# Patient Record
Sex: MALE | Race: WHITE
[De-identification: names, ages, dates, MRNs, and addresses within clinical notes are randomized per-mention and may not be internally consistent; named-entity substitution may affect disease eponyms.]

---

## 2018-01-22 ENCOUNTER — HOSPITAL ENCOUNTER (OUTPATIENT)
Dept: HOSPITAL 47 - ORWHC2ENDO | Age: 68
Discharge: HOME | End: 2018-01-22
Attending: SURGERY
Payer: MEDICARE

## 2018-01-22 VITALS — SYSTOLIC BLOOD PRESSURE: 115 MMHG | RESPIRATION RATE: 16 BRPM | HEART RATE: 81 BPM | DIASTOLIC BLOOD PRESSURE: 71 MMHG

## 2018-01-22 VITALS — TEMPERATURE: 98.4 F

## 2018-01-22 VITALS — BODY MASS INDEX: 30.8 KG/M2

## 2018-01-22 DIAGNOSIS — K57.30: ICD-10-CM

## 2018-01-22 DIAGNOSIS — Z87.891: ICD-10-CM

## 2018-01-22 DIAGNOSIS — I10: ICD-10-CM

## 2018-01-22 DIAGNOSIS — Z79.899: ICD-10-CM

## 2018-01-22 DIAGNOSIS — Z12.11: Primary | ICD-10-CM

## 2018-01-22 NOTE — P.OP
Date of Procedure: 01/22/18


Preoperative Diagnosis: 


Screening colonoscopy


Postoperative Diagnosis: 


Diverticulosis


Procedure(s) Performed: 


Colonoscopy


Anesthesia: MAC


Surgeon: Jorge Lambert


Pathology: none sent


Condition: stable


Disposition: PACU


Description of Procedure: 


Patient's placed on the endoscopy table in the lateral position.  He received 

IV sedation.  Digital rectal exam was performed which revealed no 

abnormalities.  The prostate was symmetrical without nodules.  The flexible 

colonoscope was then placed patient anus and passed throughout the entire 

colon.  The ileocecal valve was visualized.  The cecum, ascending and 

transverse colon appeared normal.  The descending and sigmoid colon had 

moderate diverticular changes.  Scope was then brought back the rectum and this 

appeared normal.  Scope was withdrawn for patient.

## 2018-01-22 NOTE — P.GSHP
History of Present Illness


H&P Date: 01/22/18


Chief Complaint: Screening colonoscopy





This a 67-year-old male referred from Dr. Santiago.  Patient rents today for 

screening colonoscopy.  He denies a significant GI complaints.





Past Medical History


Past Medical History: Hypertension


History of Any Multi-Drug Resistant Organisms: None Reported


Additional Past Surgical History / Comment(s): colonoscopy


Past Anesthesia/Blood Transfusion Reactions: No Reported Reaction


Smoking Status: Former smoker





- Past Family History


  ** Mother


Family Medical History: Cancer





Medications and Allergies


 Home Medications











 Medication  Instructions  Recorded  Confirmed  Type


 


Losartan-Hctz 50-12.5 mg [Hyzaar 1 each PO DAILY 01/18/18 01/22/18 History





50-12.5]    











 Allergies











Allergy/AdvReac Type Severity Reaction Status Date / Time


 


No Known Allergies Allergy   Verified 01/22/18 09:24














Surgical - Exam


 Vital Signs











Temp Pulse Resp BP Pulse Ox


 


 98.4 F   84   18   136/75   96 


 


 01/22/18 09:44  01/22/18 09:44  01/22/18 09:44  01/22/18 09:44  01/22/18 09:44














- General


well developed, no distress





- Eyes


PERRL





- ENT


normal pinna





- Neck


no masses





- Respiratory


normal expansion





- Cardiovascular


Rhythm: regular





- Abdomen


Abdomen: soft





Assessment and Plan


Assessment: 





We'll perform screening colonoscopy.

## 2019-10-18 ENCOUNTER — HOSPITAL ENCOUNTER (EMERGENCY)
Dept: HOSPITAL 47 - EC | Age: 69
Discharge: ANOTHER HOSPITAL | End: 2019-10-18
Payer: MEDICARE

## 2019-10-18 VITALS
DIASTOLIC BLOOD PRESSURE: 84 MMHG | HEART RATE: 75 BPM | TEMPERATURE: 97.2 F | SYSTOLIC BLOOD PRESSURE: 141 MMHG | RESPIRATION RATE: 15 BRPM

## 2019-10-18 DIAGNOSIS — Z79.899: ICD-10-CM

## 2019-10-18 DIAGNOSIS — Z87.891: ICD-10-CM

## 2019-10-18 DIAGNOSIS — I44.0: ICD-10-CM

## 2019-10-18 DIAGNOSIS — I45.10: ICD-10-CM

## 2019-10-18 DIAGNOSIS — I10: ICD-10-CM

## 2019-10-18 DIAGNOSIS — R11.2: ICD-10-CM

## 2019-10-18 DIAGNOSIS — I49.3: ICD-10-CM

## 2019-10-18 DIAGNOSIS — G45.9: Primary | ICD-10-CM

## 2019-10-18 LAB
ALBUMIN SERPL-MCNC: 4.2 G/DL (ref 3.5–5)
ALP SERPL-CCNC: 53 U/L (ref 38–126)
ALT SERPL-CCNC: 30 U/L (ref 21–72)
ANION GAP SERPL CALC-SCNC: 11 MMOL/L
APTT BLD: 21.2 SEC (ref 22–30)
AST SERPL-CCNC: 27 U/L (ref 17–59)
BASOPHILS # BLD AUTO: 0 K/UL (ref 0–0.2)
BASOPHILS NFR BLD AUTO: 1 %
BUN SERPL-SCNC: 17 MG/DL (ref 9–20)
CALCIUM SPEC-MCNC: 9.1 MG/DL (ref 8.4–10.2)
CHLORIDE SERPL-SCNC: 108 MMOL/L (ref 98–107)
CO2 SERPL-SCNC: 22 MMOL/L (ref 22–30)
EOSINOPHIL # BLD AUTO: 0.1 K/UL (ref 0–0.7)
EOSINOPHIL NFR BLD AUTO: 3 %
ERYTHROCYTE [DISTWIDTH] IN BLOOD BY AUTOMATED COUNT: 4.49 M/UL (ref 4.3–5.9)
ERYTHROCYTE [DISTWIDTH] IN BLOOD: 12.2 % (ref 11.5–15.5)
GLUCOSE SERPL-MCNC: 128 MG/DL (ref 74–99)
HCT VFR BLD AUTO: 43 % (ref 39–53)
HGB BLD-MCNC: 14.4 GM/DL (ref 13–17.5)
INR PPP: 0.9 (ref ?–1.2)
LYMPHOCYTES # SPEC AUTO: 1.4 K/UL (ref 1–4.8)
LYMPHOCYTES NFR SPEC AUTO: 31 %
MCH RBC QN AUTO: 32.1 PG (ref 25–35)
MCHC RBC AUTO-ENTMCNC: 33.5 G/DL (ref 31–37)
MCV RBC AUTO: 95.9 FL (ref 80–100)
MONOCYTES # BLD AUTO: 0.2 K/UL (ref 0–1)
MONOCYTES NFR BLD AUTO: 5 %
NEUTROPHILS # BLD AUTO: 2.6 K/UL (ref 1.3–7.7)
NEUTROPHILS NFR BLD AUTO: 58 %
PH UR: 7 [PH] (ref 5–8)
PLATELET # BLD AUTO: 147 K/UL (ref 150–450)
POTASSIUM SERPL-SCNC: 3.7 MMOL/L (ref 3.5–5.1)
PROT SERPL-MCNC: 7 G/DL (ref 6.3–8.2)
PT BLD: 10.2 SEC (ref 9–12)
SODIUM SERPL-SCNC: 141 MMOL/L (ref 137–145)
SP GR UR: 1.01 (ref 1–1.03)
UROBILINOGEN UR QL STRIP: <2 MG/DL (ref ?–2)
WBC # BLD AUTO: 4.5 K/UL (ref 3.8–10.6)

## 2019-10-18 PROCEDURE — 71046 X-RAY EXAM CHEST 2 VIEWS: CPT

## 2019-10-18 PROCEDURE — 96374 THER/PROPH/DIAG INJ IV PUSH: CPT

## 2019-10-18 PROCEDURE — 80053 COMPREHEN METABOLIC PANEL: CPT

## 2019-10-18 PROCEDURE — 99285 EMERGENCY DEPT VISIT HI MDM: CPT

## 2019-10-18 PROCEDURE — 84484 ASSAY OF TROPONIN QUANT: CPT

## 2019-10-18 PROCEDURE — 85025 COMPLETE CBC W/AUTO DIFF WBC: CPT

## 2019-10-18 PROCEDURE — 93005 ELECTROCARDIOGRAM TRACING: CPT

## 2019-10-18 PROCEDURE — 80306 DRUG TEST PRSMV INSTRMNT: CPT

## 2019-10-18 PROCEDURE — 36415 COLL VENOUS BLD VENIPUNCTURE: CPT

## 2019-10-18 PROCEDURE — 81003 URINALYSIS AUTO W/O SCOPE: CPT

## 2019-10-18 PROCEDURE — 70450 CT HEAD/BRAIN W/O DYE: CPT

## 2019-10-18 PROCEDURE — 96361 HYDRATE IV INFUSION ADD-ON: CPT

## 2019-10-18 PROCEDURE — 85610 PROTHROMBIN TIME: CPT

## 2019-10-18 PROCEDURE — 85730 THROMBOPLASTIN TIME PARTIAL: CPT

## 2019-10-18 NOTE — CT
EXAMINATION TYPE: CT brain wo con

 

DATE OF EXAM: 10/18/2019

 

COMPARISON: None

 

HISTORY: ams, vomiting

 

CT DLP: 1165.4 mGycm

Automated exposure control for dose reduction was used.

 

FINDINGS: 

Ventricles and sulci appear normal. There is no mass effect nor midline shift. There is no sign of in
tracranial hemorrhage. The calvarium is intact.

 

IMPRESSION: 

NEGATIVE CT SCAN OF THE BRAIN.
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
## 2019-10-18 NOTE — XR
EXAMINATION TYPE: XR chest 2V

 

DATE OF EXAM: 10/18/2019

 

COMPARISON: NONE

 

HISTORY: Altered mental status

 

TECHNIQUE:  Frontal and lateral views of the chest are obtained.

 

FINDINGS:  There is no heart failure nor confluent pneumonic infiltrate. Costophrenic angles are marco
r. Bony thorax is intact. There is slight elevated right diaphragm.

 

IMPRESSION:  Slight elevated right diaphragm could relate to partial paralysis. Normal heart. No hear
t failure.

## 2021-11-26 ENCOUNTER — HOSPITAL ENCOUNTER (EMERGENCY)
Dept: HOSPITAL 47 - EC | Age: 71
Discharge: HOME | End: 2021-11-26
Payer: MEDICARE

## 2021-11-26 VITALS — SYSTOLIC BLOOD PRESSURE: 175 MMHG | DIASTOLIC BLOOD PRESSURE: 94 MMHG

## 2021-11-26 VITALS — HEART RATE: 89 BPM | TEMPERATURE: 98 F

## 2021-11-26 VITALS — RESPIRATION RATE: 18 BRPM

## 2021-11-26 DIAGNOSIS — F12.90: ICD-10-CM

## 2021-11-26 DIAGNOSIS — I10: Primary | ICD-10-CM

## 2021-11-26 DIAGNOSIS — Z79.899: ICD-10-CM

## 2021-11-26 LAB
ANION GAP SERPL CALC-SCNC: 6 MMOL/L
BASOPHILS # BLD AUTO: 0 K/UL (ref 0–0.2)
BASOPHILS NFR BLD AUTO: 1 %
BUN SERPL-SCNC: 13 MG/DL (ref 9–20)
CALCIUM SPEC-MCNC: 9.1 MG/DL (ref 8.4–10.2)
CHLORIDE SERPL-SCNC: 108 MMOL/L (ref 98–107)
CO2 SERPL-SCNC: 24 MMOL/L (ref 22–30)
EOSINOPHIL # BLD AUTO: 0.1 K/UL (ref 0–0.7)
EOSINOPHIL NFR BLD AUTO: 3 %
ERYTHROCYTE [DISTWIDTH] IN BLOOD BY AUTOMATED COUNT: 4.59 M/UL (ref 4.3–5.9)
ERYTHROCYTE [DISTWIDTH] IN BLOOD: 12.6 % (ref 11.5–15.5)
GLUCOSE SERPL-MCNC: 123 MG/DL (ref 74–99)
HCT VFR BLD AUTO: 43 % (ref 39–53)
HGB BLD-MCNC: 15.7 GM/DL (ref 13–17.5)
LYMPHOCYTES # SPEC AUTO: 1.3 K/UL (ref 1–4.8)
LYMPHOCYTES NFR SPEC AUTO: 41 %
MAGNESIUM SPEC-SCNC: 2.3 MG/DL (ref 1.6–2.3)
MCH RBC QN AUTO: 34.2 PG (ref 25–35)
MCHC RBC AUTO-ENTMCNC: 36.5 G/DL (ref 31–37)
MCV RBC AUTO: 93.6 FL (ref 80–100)
MONOCYTES # BLD AUTO: 0.2 K/UL (ref 0–1)
MONOCYTES NFR BLD AUTO: 7 %
NEUTROPHILS # BLD AUTO: 1.4 K/UL (ref 1.3–7.7)
NEUTROPHILS NFR BLD AUTO: 44 %
PLATELET # BLD AUTO: 129 K/UL (ref 150–450)
POTASSIUM SERPL-SCNC: 4.1 MMOL/L (ref 3.5–5.1)
SODIUM SERPL-SCNC: 138 MMOL/L (ref 137–145)
WBC # BLD AUTO: 3.1 K/UL (ref 3.8–10.6)

## 2021-11-26 PROCEDURE — 93005 ELECTROCARDIOGRAM TRACING: CPT

## 2021-11-26 PROCEDURE — 36415 COLL VENOUS BLD VENIPUNCTURE: CPT

## 2021-11-26 PROCEDURE — 99284 EMERGENCY DEPT VISIT MOD MDM: CPT

## 2021-11-26 PROCEDURE — 83735 ASSAY OF MAGNESIUM: CPT

## 2021-11-26 PROCEDURE — 85025 COMPLETE CBC W/AUTO DIFF WBC: CPT

## 2021-11-26 PROCEDURE — 80048 BASIC METABOLIC PNL TOTAL CA: CPT

## 2021-11-26 PROCEDURE — 71046 X-RAY EXAM CHEST 2 VIEWS: CPT

## 2021-11-26 PROCEDURE — 96374 THER/PROPH/DIAG INJ IV PUSH: CPT

## 2021-11-26 RX ADMIN — HYDRALAZINE HYDROCHLORIDE STA MG: 20 INJECTION INTRAMUSCULAR; INTRAVENOUS at 14:17

## 2021-11-26 RX ADMIN — HYDRALAZINE HYDROCHLORIDE STA MG: 20 INJECTION INTRAMUSCULAR; INTRAVENOUS at 12:28

## 2021-11-26 NOTE — XR
EXAMINATION TYPE: XR chest 2V

 

DATE OF EXAM: 11/26/2021

 

COMPARISON: Chest x-ray October 18, 2019

 

HISTORY: Hypertension and chest pain

 

TECHNIQUE:  Frontal and lateral views of the chest are obtained.

 

FINDINGS:  There is no suspicious new focal air space opacity, pleural effusion, or pneumothorax seen
.  The cardiac silhouette size is stable and within normal limits.   The osseous structures are intac
t.

 

IMPRESSION:  No acute process. No significant change from prior.

## 2021-11-26 NOTE — ED
General Adult HPI





- General


Chief complaint: Recheck/Abnormal Lab/Rx


Stated complaint: High Blood Pressure


Time Seen by Provider: 11/26/21 12:07


Source: patient, EMS, RN notes reviewed, old records reviewed


Mode of arrival: EMS


Limitations: no limitations





- History of Present Illness


Initial comments: 





Patient is a 71-year-old male with past medical history remarkable for 

hypertension and has not followed up with the PCP in 2 years presents emergency 

department over concern for hypertension.  He is concerned that his blood 

pressure requires better control, and has noticed over the last few days that 

when he monitors at home, and is slightly elevated.He may have burst blood 

vessel over his right eyelid over this time as well.  2 days ago he did have a 

mild unilateral headache that was approximately 110 resolved with a mild dose of

aspirin.  His no other acute complaints at this time.  Denies any chest pain, 

shortness of breath, abdominal pain, nausea, vomiting.  Has no back pain, lower 

extremity weakness or edema.  Denies any numbness or weakness or sensory 

deficits.  He has no other acute complaints at this time.  He is on losartan 50 

mg at home for his hypertension.  He was, chills, sick contacts.  No history of 

brain aneurysms in himself or family members.  His no other acute complaints at 

this time.  He presents over concern for his hypertension.  He states he is 

compliant with his medications.  I evaluated the patient when he was placed in a

room.  Patient is not on blood thinners.





- Related Data


                                Home Medications











 Medication  Instructions  Recorded  Confirmed


 


Losartan [Cozaar] 50 mg PO DAILY 10/18/19 11/26/21


 


Glucosam/Jason-Msm1/C/Daniel/Bosw 1 tab PO DAILY 11/26/21 11/26/21





[Glucosamine-Chondroitin Tablet]   


 


Multivitamins, Thera [Multivitamin 1 tab PO DAILY 11/26/21 11/26/21





(formulary)]   











                                    Allergies











Allergy/AdvReac Type Severity Reaction Status Date / Time


 


No Known Allergies Allergy   Verified 11/26/21 13:01














Review of Systems


ROS Statement: 


Those systems with pertinent positive or pertinent negative responses have been 

documented in the HPI.


Review of Systems:


CONST: Denies fever 


EYES: Denies blurry vision 


ENT: Denies nasal congestion  


C/V:  Denies Chest pain


RESP: Denies shortness of breath 


GI: Denies abdominal pain 


: Denies dysuria  


SKIN: Denies rash.


MSK: Denies joint pain.


NEURO: Denies headache 


ROS Other: All systems not noted in ROS Statement are negative.





Past Medical History


Past Medical History: Hypertension


History of Any Multi-Drug Resistant Organisms: None Reported


Additional Past Surgical History / Comment(s): colonoscopy


Past Anesthesia/Blood Transfusion Reactions: No Reported Reaction


Past Psychological History: No Psychological Hx Reported


Smoking Status: Never smoker


Past Alcohol Use History: Daily


Past Drug Use History: Marijuana





- Past Family History


  ** Mother


Family Medical History: Cancer





General Exam





- General Exam Comments


Initial Comments: 





General: Appears in no acute distress.


HEAD:  Normal with no signs of head trauma.


EYES:  PERRLA, EOMI, conjunctiva normal, no discharge.  Pupils are 3 mm equal 

bilaterally.


ENT:  Hearing grossly intact, normal oropharynx.


RESPIRATORY:  Clear breath sounds bilaterally.  No wheezes, rales, or rhonchi.  


C/V:  Regular rate and rhythm. S1 and S2 auscultated, no edema, peripheral 

pulses 2+ and intact throughout


ABD:  Abd is soft, nontender, nondistended


EXT: Normal range of motion, no obvious deformity


SKIN:  No rashes or lesions observed on exposed skin.  Patient does have 

bruising on his right upper eyelid that is nontender.  He is to be possibly a 

burst blood vessel.  No eye involvement.


NEURO: Alert and oriented x 4.  Cranial nerves II-XII intact. No focal sensory 

or strength deficits.  Patient is able to ambulate without difficulty.  NIH is 

0.  GCS is 15.  Cerebellar function is intact as evident by normal finger to 

nose testing.


Limitations: no limitations





Course


                                   Vital Signs











  11/26/21 11/26/21 11/26/21





  11:44 12:00 12:28


 


Temperature 98.0 F  


 


Pulse Rate 89  


 


Respiratory 20  





Rate   


 


Blood Pressure 204/123 199/114 192/109


 


O2 Sat by Pulse 97 97 





Oximetry   














  11/26/21 11/26/21 11/26/21





  13:00 13:46 13:52


 


Temperature   


 


Pulse Rate   


 


Respiratory   18





Rate   


 


Blood Pressure 178/103 174/66 181/105


 


O2 Sat by Pulse   





Oximetry   














  11/26/21 11/26/21 11/26/21





  14:00 14:13 15:00


 


Temperature   


 


Pulse Rate   


 


Respiratory   





Rate   


 


Blood Pressure 181/105 182/102 171/93


 


O2 Sat by Pulse   





Oximetry   














  11/26/21





  15:15


 


Temperature 


 


Pulse Rate 


 


Respiratory 





Rate 


 


Blood Pressure 175/94


 


O2 Sat by Pulse 





Oximetry 














Medical Decision Making





- Medical Decision Making





Based on the patient's presentation and physical exam, he appears to be 

experiencing asymptomatic hypertension with a history of hypertension at this 

time.  Blood pressure was over 200 systolic in triage.  Patient does state he is

anxious and is concerned that something may be going on.  However he has no 

acute complaints otherwise at this time.  States he did take his 50 mg losartan 

today.  However I would like to obtain a screening EKG as well as screening labo

ratory studies including assessing kidney function.  He'll be administered 10 mg

of IV hydralazine.  Blood pressure is already improving from over 200 systolic 

to 190 systolic here in the department.  He'll be continued on continuous 

cardiac monitoring.  Patient was in agreement this plan.





Patient's EKG showed normal sinus rhythm with no signs of acute ischemia.Chest 

x-ray showed no acute cardiopulmonary process.  Laboratory studies are 

unremarkable except for mild thrombocytopenia of 129, which appears to be 

chronic..  Following antihypertensive medication, patient's blood pressure is 

improved.  Patient remains asymptomatic at this time.  As the patient is 

expressing a symptomatically hypertension, I do not believe that we need to 

aggressively lower it at this time.  He does have plans to follow-up with his 

physician first thing next week.  I believe it is safe for him to be discharged 

home at this time.





 I instructed the patient to follow up with their PCP in the next 3 days.  I 

explained that the patient should return to the emergency department if they exp

erience any worsening symptoms. Strict return precautions were discussed with 

the patient. The patient expressed understanding of these instructions. I 

answered all questions that the patient had. The patient was discharged home in 

good condition with their prescriptions and follow up information.





- Lab Data


Result diagrams: 


                                 11/26/21 12:21





                                 11/26/21 12:21


                                   Lab Results











  11/26/21 11/26/21 Range/Units





  12:21 12:21 


 


WBC  3.1 L   (3.8-10.6)  k/uL


 


RBC  4.59   (4.30-5.90)  m/uL


 


Hgb  15.7   (13.0-17.5)  gm/dL


 


Hct  43.0   (39.0-53.0)  %


 


MCV  93.6   (80.0-100.0)  fL


 


MCH  34.2   (25.0-35.0)  pg


 


MCHC  36.5   (31.0-37.0)  g/dL


 


RDW  12.6   (11.5-15.5)  %


 


Plt Count  129 L   (150-450)  k/uL


 


MPV  8.3   


 


Neutrophils %  44   %


 


Lymphocytes %  41   %


 


Monocytes %  7   %


 


Eosinophils %  3   %


 


Basophils %  1   %


 


Neutrophils #  1.4   (1.3-7.7)  k/uL


 


Lymphocytes #  1.3   (1.0-4.8)  k/uL


 


Monocytes #  0.2   (0-1.0)  k/uL


 


Eosinophils #  0.1   (0-0.7)  k/uL


 


Basophils #  0.0   (0-0.2)  k/uL


 


Hyperchromasia  Slight   


 


Sodium   138  (137-145)  mmol/L


 


Potassium   4.1  (3.5-5.1)  mmol/L


 


Chloride   108 H  ()  mmol/L


 


Carbon Dioxide   24  (22-30)  mmol/L


 


Anion Gap   6  mmol/L


 


BUN   13  (9-20)  mg/dL


 


Creatinine   1.08  (0.66-1.25)  mg/dL


 


Est GFR (CKD-EPI)AfAm   81  (>60 ml/min/1.73 sqM)  


 


Est GFR (CKD-EPI)NonAf   70  (>60 ml/min/1.73 sqM)  


 


Glucose   123 H  (74-99)  mg/dL


 


Calcium   9.1  (8.4-10.2)  mg/dL


 


Magnesium   2.3  (1.6-2.3)  mg/dL














- EKG Data


-: EKG Interpreted by Me


EKG Comments: 





12-lead Electrocardiogram Interpretation Note





EKG was reviewed and interpreted by myself. 12-lead ECG performed at 1246 is 

interpreted by me as revealing normal sinus rhythm at a rate of 83 beats per 

minute.  Axis is normal.  IL interval is 184 ms, QRS duration is 100 ms, QTc is 

441 ms..  There were no ST or T wave abnormalities to suggest myocardial 

ischemia or injury. R wave progression across the precordium was satisfactory. 

By my interpretation this EKG is non-diagnostic for acute ischemia.





Disposition


Clinical Impression: 


 Asymptomatic hypertension





Disposition: HOME SELF-CARE


Condition: Good


Instructions (If sedation given, give patient instructions):  Hypertension (ED)


Is patient prescribed a controlled substance at d/c from ED?: No


Referrals: 


Ramu Santiago DO [Primary Care Provider] - 1-2 days

## 2022-03-03 ENCOUNTER — HOSPITAL ENCOUNTER (EMERGENCY)
Dept: HOSPITAL 47 - EC | Age: 72
Discharge: HOME | End: 2022-03-03
Payer: MEDICARE

## 2022-03-03 VITALS — RESPIRATION RATE: 18 BRPM | TEMPERATURE: 98.3 F

## 2022-03-03 VITALS — DIASTOLIC BLOOD PRESSURE: 95 MMHG | HEART RATE: 93 BPM | SYSTOLIC BLOOD PRESSURE: 152 MMHG

## 2022-03-03 DIAGNOSIS — F12.90: ICD-10-CM

## 2022-03-03 DIAGNOSIS — S52.602A: ICD-10-CM
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
## 2022-03-03 DIAGNOSIS — I10: ICD-10-CM

## 2022-03-03 DIAGNOSIS — S52.502A: Primary | ICD-10-CM

## 2022-03-03 PROCEDURE — 96375 TX/PRO/DX INJ NEW DRUG ADDON: CPT

## 2022-03-03 PROCEDURE — 99283 EMERGENCY DEPT VISIT LOW MDM: CPT

## 2022-03-03 PROCEDURE — 73110 X-RAY EXAM OF WRIST: CPT

## 2022-03-03 PROCEDURE — 96376 TX/PRO/DX INJ SAME DRUG ADON: CPT

## 2022-03-03 PROCEDURE — 29125 APPL SHORT ARM SPLINT STATIC: CPT

## 2022-03-03 PROCEDURE — 96374 THER/PROPH/DIAG INJ IV PUSH: CPT

## 2022-03-03 NOTE — ED
General Adult HPI





- General


Chief complaint: Extremity Injury, Upper


Stated complaint: Fall/Lt Arm Injury


Time Seen by Provider: 03/03/22 12:15


Source: patient, RN notes reviewed, old records reviewed


Mode of arrival: ambulatory


Limitations: no limitations





- History of Present Illness


Initial comments: 





This a 71-year-old male who presents to the emergency department stating that he

fell and landed on a log onto his left wrist.  Patient has deformity left wrist 

he came in because he is concerned that it might broke his wrist.  Patient 

denies any elbow pain or hand pain.  Patient denies any shoulder pain.  Patient 

denies any head or neck pain.  Patient denies any other complaints at this time.

 Patient states he slipped on the ice and that was the cause of the fall.





- Related Data


                                Home Medications











 Medication  Instructions  Recorded  Confirmed


 


Glucosam/Jason-Msm1/C/Daniel/Bosw 1 tab PO DAILY 11/26/21 03/03/22





[Glucosamine-Chondroitin Tablet]   


 


Multivitamins, Thera [Multivitamin 1 tab PO DAILY 11/26/21 03/03/22





(formulary)]   


 


Ibuprofen [Motrin Ib] 600 mg PO Q8H PRN 03/03/22 03/03/22


 


Losartan-Hctz 50-12.5 mg [Hyzaar 1 tab PO DAILY 03/03/22 03/03/22





50-12.5]   








                                  Previous Rx's











 Medication  Instructions  Recorded


 


Ibuprofen [Motrin] 600 mg PO Q6HR PRN #20 tab 03/03/22











                                    Allergies











Allergy/AdvReac Type Severity Reaction Status Date / Time


 


No Known Allergies Allergy   Verified 11/26/21 13:01














Review of Systems


ROS Statement: 


Those systems with pertinent positive or pertinent negative responses have been 

documented in the HPI.





ROS Other: All systems not noted in ROS Statement are negative.





Past Medical History


Past Medical History: Hypertension


History of Any Multi-Drug Resistant Organisms: None Reported


Additional Past Surgical History / Comment(s): colonoscopy


Past Anesthesia/Blood Transfusion Reactions: No Reported Reaction


Past Psychological History: No Psychological Hx Reported


Smoking Status: Never smoker


Past Alcohol Use History: Daily


Past Drug Use History: Marijuana





- Past Family History


  ** Mother


Family Medical History: Cancer





General Exam





- General Exam Comments


Initial Comments: 





GENERAL 


Patient is well-developed and well-nourished.  Patient is in mild distress.





EYES


Patient's pupils are equal and round.  Extraocular motion is intact





SKIN


Unremarkable





NEURO


The patient is alert and oriented 3





PYSCH


Patient has normal interpersonal interactions.





MUSCULOSKELETAL


Patient has deformity of the left breast and is tender and swollen about the 

wrist.


Limitations: no limitations





Course


                                   Vital Signs











  03/03/22





  12:13


 


Temperature 98.3 F


 


Pulse Rate 104 H


 


Respiratory 18





Rate 


 


Blood Pressure 185/83


 


O2 Sat by Pulse 98





Oximetry 














Procedures





- Orthopedic Splinting/Casting


  ** Injury #1


Side: left


Upper Extremity Injury Location: wrist


Upper Extremity Immobilizer: volar splint





Medical Decision Making





- Medical Decision Making





X-ray shows a distal radius fracture with a small avulsion fracture of the ulna.

  There is minimal displacement.





Disposition


Clinical Impression: 


 Fracture of radius and ulna near wrist





Disposition: HOME SELF-CARE


Condition: Good


Instructions (If sedation given, give patient instructions):  Wrist Fracture in 

Adults (ED)


Prescriptions: 


Ibuprofen [Motrin] 600 mg PO Q6HR PRN #20 tab


 PRN Reason: For pain   


Is patient prescribed a controlled substance at d/c from ED?: No


Referrals: 


Zach Nieto MD [STAFF PHYSICIAN] - 1-2 days


Time of Disposition: 13:41

## 2022-03-03 NOTE — XR
EXAMINATION TYPE: XR wrist complete LT

 

DATE OF EXAM: 3/3/2022

 

COMPARISON: None available

 

INDICATION: Pain and fall

 

TECHNIQUE: 4 views of the left wrist

 

FINDINGS: 

Comminuted fracture involving the distal radial metaphysis extending to the distal radioulnar articul
ation and radiocarpal articulation. Dorsal angulation of the distal fracture fragment with  
bone fragments measuring up to 18 mm.

 

Suspected tiny avulsion fracture of the ulnar styloid process. Tiny osteophytosis of the radiocarpal 
articulation.

 

IMPRESSION:

Fractured distal radius with suspected tiny avulsion fracture of the ulnar styloid process as describ
ed above. Recommend orthopedic consultation.

## 2022-03-04 ENCOUNTER — HOSPITAL ENCOUNTER (OUTPATIENT)
Dept: HOSPITAL 47 - LABPAT | Age: 72
Discharge: HOME | End: 2022-03-04
Attending: ORTHOPAEDIC SURGERY
Payer: MEDICARE

## 2022-03-04 DIAGNOSIS — S52.572A: ICD-10-CM
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
## 2022-03-04 DIAGNOSIS — Z01.812: Primary | ICD-10-CM

## 2022-03-04 LAB
ERYTHROCYTE [DISTWIDTH] IN BLOOD BY AUTOMATED COUNT: 4.9 X 10*6/UL (ref 4.4–5.6)
ERYTHROCYTE [DISTWIDTH] IN BLOOD: 12.1 % (ref 11.5–14.5)
HCT VFR BLD AUTO: 45.9 % (ref 39.6–50)
HGB BLD-MCNC: 15.5 G/DL (ref 13–17)
MCH RBC QN AUTO: 31.6 PG (ref 27–32)
MCHC RBC AUTO-ENTMCNC: 33.8 G/DL (ref 32–37)
MCV RBC AUTO: 93.7 FL (ref 80–97)
NRBC BLD AUTO-RTO: 0 /100 WBCS (ref 0–0)
PLATELET # BLD AUTO: 148 X 10*3/UL (ref 140–440)
WBC # BLD AUTO: 4.66 X 10*3/UL (ref 4.5–10)

## 2022-03-04 PROCEDURE — 80051 ELECTROLYTE PANEL: CPT

## 2022-03-04 PROCEDURE — 85027 COMPLETE CBC AUTOMATED: CPT

## 2022-03-05 LAB
ANION GAP SERPL CALC-SCNC: 12.5 MMOL/L (ref 10–18)
CHLORIDE SERPL-SCNC: 104 MMOL/L (ref 96–109)
CO2 SERPL-SCNC: 23.5 MMOL/L (ref 20–27.5)
POTASSIUM SERPL-SCNC: 4 MMOL/L (ref 3.5–5.5)
SODIUM SERPL-SCNC: 140 MMOL/L (ref 135–145)

## 2022-03-07 NOTE — HP
HISTORY AND PHYSICAL



CHIEF COMPLAINT:

Left wrist pain.



HISTORY OF PRESENT ILLNESS:

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

an injury on 3/3/2022.  He slipped and fell on the ice at home.  Initially he was seen

in the emergency room and placed in a splint.  He denies previous injury.



PAST MEDICAL HISTORY:

Significant for hypertension.



PAST SURGICAL HISTORY:

Negative.



CURRENT MEDICATIONS:

Hydrocodone and losartan.



ALLERGIES:

HE DENIES DRUG ALLERGIES.



FAMILY HISTORY:

Significant for cancer.



SOCIAL HISTORY:

Significant for tobacco use.



REVIEW OF SYSTEMS:

Sixteen-point review of systems otherwise reviewed and is noncontributory.



PHYSICAL EXAMINATION:

On examination, the patient is approximately 6 feet 2 inches, 245 pounds of endomorphic

habitus.

HEENT exam is nonfocal.

Neck is supple.

He is nontender about the left shoulder and elbow. On examination of his left wrist, he

has moderate swelling.  He has tenderness over the distal radius.  He has limited

motion secondary to pain.  His distal neurovascular exam appears intact in the digits.

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  He has moderate digital stiffness.



X-rays of the left wrist obtained at the hospital show an intra-articular distal radius

fracture with dorsal comminution and moderate displacement.  This appears to be a three-

part fracture.



IMPRESSION:

Left three-part intra-articular distal radius fracture, displaced/comminuted.



RECOMMENDATIONS:

I talked to the patient at length regarding his condition along with treatment options.

At this point I recommend proceeding with surgical intervention.  We will plan to

proceed with open reduction and internal fixation of this fracture.  We will

potentially perform that as an outpatient procedure.  Risks and benefits were discussed

at length in layman's terms.





MMODL / IJN: [id number] / Job#: [id number]

## 2022-03-08 ENCOUNTER — HOSPITAL ENCOUNTER (OUTPATIENT)
Dept: HOSPITAL 47 - OR | Age: 72
Discharge: HOME | End: 2022-03-08
Attending: ORTHOPAEDIC SURGERY
Payer: MEDICARE

## 2022-03-08 VITALS — RESPIRATION RATE: 20 BRPM

## 2022-03-08 VITALS — DIASTOLIC BLOOD PRESSURE: 76 MMHG | SYSTOLIC BLOOD PRESSURE: 149 MMHG | HEART RATE: 74 BPM

## 2022-03-08 VITALS — TEMPERATURE: 97.7 F

## 2022-03-08 VITALS — BODY MASS INDEX: 31.4 KG/M2

## 2022-03-08 DIAGNOSIS — I10: ICD-10-CM

## 2022-03-08 DIAGNOSIS — S52.572A: Primary | ICD-10-CM

## 2022-03-08 DIAGNOSIS — Z79.899: ICD-10-CM

## 2022-03-08 DIAGNOSIS — Z80.9: ICD-10-CM
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
## 2022-03-08 DIAGNOSIS — Z79.891: ICD-10-CM

## 2022-03-08 PROCEDURE — 25609 OPTX DST RD XART FX/EP SEP3+: CPT

## 2022-03-08 PROCEDURE — 73100 X-RAY EXAM OF WRIST: CPT

## 2022-03-08 PROCEDURE — 64417 NJX AA&/STRD AX NERVE IMG: CPT

## 2022-03-08 PROCEDURE — 76942 ECHO GUIDE FOR BIOPSY: CPT

## 2022-03-08 PROCEDURE — 64415 NJX AA&/STRD BRCH PLXS IMG: CPT

## 2022-03-08 NOTE — P.OP
Date of Procedure: 03/08/22


Preoperative Diagnosis: 


Displaced three-part intra-articular left distal radius fracture


Postoperative Diagnosis: 


Same


Procedure(s) Performed: 


Open reduction and internal fixation left 3 part intra-articular distal radius 

fracture


Implants: 


Arthrex wide 3-hole volar distal radial plate


Anesthesia: GETA, regional


Surgeon: Zach Nieto


Assistant #1: Nasir Mills


Estimated Blood Loss (ml): 10


Pathology: none sent


Condition: stable


Disposition: PACU


Indications for Procedure: 


The patient's a 71-year-old male presents with left wrist pain after recent 

fall.





Upon evaluation he was noted to have displaced intra-articular left distal 

radius fracture.  A discussion of the risks and benefits of operative 

intervention was made with patient.  He opted to proceed with surgery.  

Operative risks to include infection, neurovascular injury, development of blood

clots, possible development of nonunion/malunion for subsequent procedures was 

discussed.  Informed consent was obtained.


Operative Findings: 


as below


Description of Procedure: 


The patient was brought to the operating room, and after induction of general 

anesthesia the left upper extremity was prepped and draped in normal fashion.  

The tourniquet was inflated to 250 mmHg.  An 8 cm incision was then made along 

the volar radial aspect of the left wrist centered over the flexor carpi 

radialis.  The skin and subcu tissues were divided sharply.  Electrocautery was 

used for hemostasis.  The flexor carpi radialis sheath was opened and the tendon

was gently retracted ulnarly and the radial artery retracted radially.  The 

underlying fascia was opened.  The contents the carpal canal were bluntly 

dissected ulnarly.  A self-retaining retractor was placed.  The pronator 

quadratus was elevated off the radial aspect the distal radius.  The fracture 

site was identified and cleaned of clot and debris.  The fracture was then 

provisionally reduced with longitudinal traction and manipulation.  This is 

verified with fluoroscopy.  A wide 3 hole volar plate was placed provisionally 

along the volar surface.  This was held with K wires.  This was verified with 

fluoroscopy.  A 3.5 mm cortical screws placed proximally to secure the plate 

against the distal radius.  I then placed partially threaded locking pegs along 

the distal row.  Again this was done with the aid of fluoroscopy.  The proximal 

row was filled in a similar fashion with the appropriate length locking pegs.  

The remaining 3.5 mm cortical screws were placed proximally.  Final fluoroscopic

views to include AP, PA, and elevated lateral showed adequate reduction of the 

fracture and placement of the implant.  The articular surface appeared to be 

well aligned with less than 1 mm articular step-off.  The wound was irrigated 

with normal saline.  The pronator quadratus was repaired with simple 3-0 Vicryl 

suture.  The subcu tissues reapproximated interrupted 3-0 Vicryl suture.  The 

skin was reprepped with 4-0 subcuticular Prolene suture.  Steri-Strips were 

applied.  The tourniquet was deflated with approximately 1 hour total tourniquet

time.  A sterile dressing was applied in addition to a volar splint.  The 

patient was awoken from general anesthesia and transferred to the recovery room 

in good condition.  Blood loss was estimated at 10 mL.  No complications were 

incurred.  Sponge and needle counts were correct at the end the case.  Nasir JACINTO assisted during the major components the case to include positioning, 

exposure, implantation, and closure.

## 2022-03-08 NOTE — FL
EXAMINATION TYPE: FL guidance operating room

 

DATE OF EXAM: 3/8/2022

 

CLINICAL HISTORY: ORIF distal radius

 

TECHNIQUE: Fluoroscopy.

 

COMPARISON:  X-ray dated 3/3/2022

 

FINDINGS:  Fluoroscopic guidance was provided during procedure performed by the orthopedic surgeon. A
 total of 38 seconds of fluoroscopic time was utilized during the procedure and 3 spot images were ac
quired. ORIF of the distal radial fracture using a plate and multiple screws. Slightly negative ulnar
 variance.

 

IMPRESSION:  As Above.

## 2022-03-08 NOTE — P.ANPRN
Procedure Note - Anesthesia





- Nerve Block Performed


  ** Left Axillary Single


Time Out Performed: Yes (704)


Date of Procedure: 03/08/22


Procedure Start Time: 07:05


Procedure Stop Time: 07:12


Location of Patient: PreOp


Indication: Acute Post-Operative Pain, Requested by Surgeon


Specifically requested for management of pain by DrRichy: Zach Nieto


Sedation Type: Sedate with meaningful contact maintained


Preparation: Sterile Prep


Position: Supine


Catheter: None


Needle Types: Pajunk


Needle Gauge: 21


Ultrasound used to visualize needle placement: Yes


Ultrasound used to observe medication spread: Yes


Injectate: 0.5% Ropivacaine (see comment for volume) (30cc + 10cc saline.   10cc

each radial, median, ulnar, mskcut)


Blood Aspirated: No


Pain Paresthesia on Injection Noted: No


Resistance on Injection: Normal


Image Stored and Saved: Yes


Events: Uneventful and Well Tolerated

## 2025-03-20 ENCOUNTER — HOSPITAL ENCOUNTER (OUTPATIENT)
Dept: HOSPITAL 47 - PNWHC3 | Age: 75
End: 2025-03-20
Attending: ANESTHESIOLOGY
Payer: MEDICARE

## 2025-03-20 VITALS
DIASTOLIC BLOOD PRESSURE: 81 MMHG | SYSTOLIC BLOOD PRESSURE: 142 MMHG | RESPIRATION RATE: 16 BRPM | TEMPERATURE: 98 F | HEART RATE: 104 BPM

## 2025-03-20 DIAGNOSIS — M48.061: Primary | ICD-10-CM

## 2025-03-20 DIAGNOSIS — Z87.891: ICD-10-CM

## 2025-03-20 DIAGNOSIS — M54.18: ICD-10-CM

## 2025-03-20 DIAGNOSIS — M51.26: ICD-10-CM

## 2025-03-20 PROCEDURE — 99202 OFFICE O/P NEW SF 15 MIN: CPT

## 2025-03-20 NOTE — P.PAINCN
History of Present Illness





- History of Present Illness


This is a 74-year-old pleasant gentleman who had a fall on ice in January of t

his year since then he has been having low back pain radiating down to left 

lower extremity.  Pain is located in the low back going down to left lower 

extremity in the back of left calf and lateral aspect of left foot area.  Pain 

is there all the time with any activity that makes his pain worse.  Medication 

makes his pain slightly controllable.


Denies any bowel or bladder dysfunction.


Complains of some weakness in his left lower extremity.  Denies any loss of 

sensation in lower extremities.








Past Medical History


Past Medical History: Hypertension


History of Any Multi-Drug Resistant Organisms: None Reported


Past Surgical History: No Surgical Hx Reported


Additional Past Surgical History / Comment(s): colonoscopy


Past Anesthesia/Blood Transfusion Reactions: No Reported Reaction


Past Psychological History: No Psychological Hx Reported


Smoking Status: Never smoker


Past Alcohol Use History: None Reported


Additional Past Alcohol Use History / Comment(s): quit smoking 40 yrs ago, 

smoked 5 yrs


Past Drug Use History: Marijuana


Additional Drug Use History / Comment(s): rarely uses recreational marijuana





- Past Family History


  ** Mother


Family Medical History: Cancer





Medications and Allergies


                                Home Medications











 Medication  Instructions  Recorded  Confirmed  Type


 


Losartan-Hctz 50-12.5 mg [Hyzaar 1 tab PO DAILY 03/03/22 03/07/22 History





50-12.5]    


 


HYDROcodone/APAP 7.5-325MG [Norco 1 tab PO Q6HR PRN 03/07/22 03/07/22 History





7.5-325]    


 


Acetaminophen-Codeine 300-30mg 1 tab PO Q6H PRN #21 tablet 03/08/22  Rx





[Tylenol w/codeine #3]    








                                    Allergies











Allergy/AdvReac Type Severity Reaction Status Date / Time


 


No Known Allergies Allergy   Verified 03/07/22 09:03














Physical Exam


Emitted physical exam secondary to excruciating pain.


Lumbar spine-significant tenderness in the midline and paraspinal areas mostly 

on the left side.


Lumbosacral spine flexion hyperextension rotation is limited secondary to pain.


Motor strength-left knee flexion extension left ankle flexion extension 4/5 on 

the left side.  5/5 on right side.


Deep tendon reflexes right ankle right knee, left knee 2+.  Left ankle 1+.


Straight leg raising test positive left side


Sensation to touch grossly intact bilaterally.








Assessment and Plan


Assessment: 


1.  Lumbar disc herniation.


2.  S1 radiculopathy.


3.  Lumbar spinal stenosis.





Plan: 


Will schedule for L5-S1 lumbar epidural steroid injection.


Discussed the procedure and possible complications which may include infection 

bleeding nerve damage paralysis aggravation of pain.  Patient understands and 

all questions were answered.








PQRS Measure Charge Sheet


PQRS Narrative: 


                                        





Smoking Status                   Former smoker








Home Medications: 


Ambulatory Orders





Losartan-Hctz 50-12.5 mg [Hyzaar 50-12.5] 1 tab PO DAILY 03/03/22 


HYDROcodone/APAP 7.5-325MG [Norco 7.5-325] 1 tab PO Q6HR PRN 03/07/22 


Acetaminophen-Codeine 300-30mg [Tylenol w/codeine #3] 1 tab PO Q6H PRN #21 

tablet 03/08/22

## 2025-04-08 ENCOUNTER — HOSPITAL ENCOUNTER (OUTPATIENT)
Dept: HOSPITAL 47 - ORPAIN | Age: 75
Discharge: HOME | End: 2025-04-08
Attending: ANESTHESIOLOGY
Payer: MEDICARE

## 2025-04-08 VITALS — SYSTOLIC BLOOD PRESSURE: 124 MMHG | DIASTOLIC BLOOD PRESSURE: 74 MMHG

## 2025-04-08 VITALS — TEMPERATURE: 98.1 F | RESPIRATION RATE: 16 BRPM

## 2025-04-08 VITALS — HEART RATE: 89 BPM

## 2025-04-08 DIAGNOSIS — M48.061: ICD-10-CM

## 2025-04-08 DIAGNOSIS — M51.16: Primary | ICD-10-CM

## 2025-04-08 PROCEDURE — 62323 NJX INTERLAMINAR LMBR/SAC: CPT
